# Patient Record
Sex: MALE | Race: BLACK OR AFRICAN AMERICAN | NOT HISPANIC OR LATINO | Employment: FULL TIME | URBAN - METROPOLITAN AREA
[De-identification: names, ages, dates, MRNs, and addresses within clinical notes are randomized per-mention and may not be internally consistent; named-entity substitution may affect disease eponyms.]

---

## 2018-03-16 ENCOUNTER — HOSPITAL ENCOUNTER (EMERGENCY)
Facility: HOSPITAL | Age: 43
Discharge: HOME | End: 2018-03-16
Attending: EMERGENCY MEDICINE
Payer: COMMERCIAL

## 2018-03-16 VITALS
OXYGEN SATURATION: 100 % | HEIGHT: 67 IN | RESPIRATION RATE: 18 BRPM | DIASTOLIC BLOOD PRESSURE: 92 MMHG | SYSTOLIC BLOOD PRESSURE: 144 MMHG | HEART RATE: 104 BPM | BODY MASS INDEX: 29.82 KG/M2 | WEIGHT: 190 LBS | TEMPERATURE: 99 F

## 2018-03-16 DIAGNOSIS — N39.0 URINARY TRACT INFECTION WITHOUT HEMATURIA, SITE UNSPECIFIED: Primary | ICD-10-CM

## 2018-03-16 LAB
BACTERIA #/AREA URNS HPF: ABNORMAL /HPF
BILIRUB UR QL STRIP: NEGATIVE
CLARITY UR: ABNORMAL
COLOR UR: YELLOW
GLUCOSE UR QL STRIP: NEGATIVE
HGB UR QL STRIP: NEGATIVE
KETONES UR QL STRIP: NEGATIVE
LEUKOCYTE ESTERASE UR QL STRIP: ABNORMAL
MICROSCOPIC COMMENT: ABNORMAL
NITRITE UR QL STRIP: NEGATIVE
PH UR STRIP: 6 [PH] (ref 5–8)
PROT UR QL STRIP: NEGATIVE
RBC #/AREA URNS HPF: 1 /HPF (ref 0–4)
SP GR UR STRIP: <=1.005 (ref 1–1.03)
SQUAMOUS #/AREA URNS HPF: 2 /HPF
URN SPEC COLLECT METH UR: ABNORMAL
UROBILINOGEN UR STRIP-ACNC: NEGATIVE EU/DL
WBC #/AREA URNS HPF: 30 /HPF (ref 0–5)
WBC CLUMPS URNS QL MICRO: ABNORMAL

## 2018-03-16 PROCEDURE — 81000 URINALYSIS NONAUTO W/SCOPE: CPT

## 2018-03-16 PROCEDURE — 99283 EMERGENCY DEPT VISIT LOW MDM: CPT

## 2018-03-16 RX ORDER — NITROFURANTOIN 25; 75 MG/1; MG/1
100 CAPSULE ORAL 2 TIMES DAILY
Qty: 14 CAPSULE | Refills: 0 | Status: SHIPPED | OUTPATIENT
Start: 2018-03-16 | End: 2018-03-21

## 2018-03-16 NOTE — ED PROVIDER NOTES
Encounter Date: 3/16/2018    SCRIBE #1 NOTE: I, Sarah Heath, am scribing for, and in the presence of,  Dr. Horton. I have scribed the entire note.       History     Chief Complaint   Patient presents with    Fever     accompanied by dysuria and frequency; states symptoms began today; denies back pain; last tylenol taken one hour pta; states taking amoxicillin x1 week and being treated for UTI     Time seen by provider: 2:54 AM     This is a 42 y.o. male with PMHx of HTN who presents with fever with temperature of 103F at triage onset x 1 day. He also c/o dysuria, urinary frequency, and abdominal pain. He denies nausea and vomiting. He reports past hx of several UTIs with most recent antibiotic completed 2 weeks ago. NKDA.       The history is provided by the patient.     Review of patient's allergies indicates:  No Known Allergies  Past Medical History:   Diagnosis Date    Hypertension      History reviewed. No pertinent surgical history.  History reviewed. No pertinent family history.  Social History   Substance Use Topics    Smoking status: Never Smoker    Smokeless tobacco: Never Used    Alcohol use No     Review of Systems   Constitutional: Positive for fever.   HENT: Negative for sore throat.    Respiratory: Negative for shortness of breath.    Cardiovascular: Negative for chest pain.   Gastrointestinal: Positive for abdominal pain. Negative for nausea and vomiting.   Genitourinary: Positive for dysuria and frequency.   Musculoskeletal: Negative for back pain.   Skin: Negative for rash.   Neurological: Negative for weakness.   Hematological: Does not bruise/bleed easily.   All other systems reviewed and are negative.      Physical Exam     Initial Vitals [03/16/18 0017]   BP Pulse Resp Temp SpO2   135/88 103 18 99.4 °F (37.4 °C) 97 %      MAP       103.67         Physical Exam    Nursing note and vitals reviewed.  Constitutional: He appears well-developed and well-nourished. He is not diaphoretic. No  distress.   HENT:   Head: Normocephalic and atraumatic.   Mouth/Throat: Oropharynx is clear and moist.   Eyes: Conjunctivae and EOM are normal. Pupils are equal, round, and reactive to light.   Neck: Normal range of motion. Neck supple. No tracheal deviation present.   Cardiovascular: Normal rate, regular rhythm and normal heart sounds. Exam reveals no gallop and no friction rub.    No murmur heard.  Pulmonary/Chest: Breath sounds normal. No respiratory distress. He has no wheezes. He has no rhonchi. He has no rales.   Abdominal: Soft. Bowel sounds are normal. He exhibits no distension. There is tenderness (mild) in the suprapubic area. There is no rebound and no guarding.   Musculoskeletal: Normal range of motion. He exhibits no edema or tenderness.   Neurological: He is alert and oriented to person, place, and time. He has normal strength.   Skin: Skin is warm and dry. No erythema. No pallor.         ED Course   Procedures  Labs Reviewed   URINALYSIS - Abnormal; Notable for the following:        Result Value    Appearance, UA Hazy (*)     Specific Gravity, UA <=1.005 (*)     Leukocytes, UA 2+ (*)     All other components within normal limits   URINALYSIS MICROSCOPIC - Abnormal; Notable for the following:     WBC, UA 30 (*)     WBC Clumps, UA Occasional (*)     Bacteria, UA Few (*)     All other components within normal limits   CULTURE, URINE             Medical Decision Making:   Clinical Tests:   Lab Tests: Ordered  ED Management:  42-year-old male with a UTI.  Patient reports having of recurrent bladder infections.  He has no signs of urosepsis.  He'll be discharged with a prescription for Macrobid and advised to follow-up with his primary physician as soon as able for recheck and further treatment as warranted.            Scribe Attestation:   Scribe #1: I performed the above scribed service and the documentation accurately describes the services I performed. I attest to the accuracy of the note.                Clinical Impression:     1. Urinary tract infection without hematuria, site unspecified        I, Dr. Jacques Horton, personally performed the services described in this documentation. All medical record entries made by the scribe were at my direction and in my presence. I have reviewed the chart and agree that the record reflects my personal performance and is accurate and complete. Jacques Horton MD.  4:23 AM 03/16/2018                           Jacques Horton MD  03/16/18 0425

## 2018-03-16 NOTE — ED NOTES
Pt presents to ED c/o dysuria, urgency, and frequency with urination. Pt denies hematuria. Pt reports symptoms began today. Pt reports having fever, states last dose of tylenol at 2300.

## 2018-03-24 ENCOUNTER — OFFICE VISIT (OUTPATIENT)
Dept: URGENT CARE | Facility: CLINIC | Age: 43
End: 2018-03-24
Payer: COMMERCIAL

## 2018-03-24 VITALS
HEART RATE: 91 BPM | OXYGEN SATURATION: 98 % | BODY MASS INDEX: 29.03 KG/M2 | DIASTOLIC BLOOD PRESSURE: 78 MMHG | WEIGHT: 185 LBS | RESPIRATION RATE: 20 BRPM | HEIGHT: 67 IN | SYSTOLIC BLOOD PRESSURE: 122 MMHG | TEMPERATURE: 99 F

## 2018-03-24 DIAGNOSIS — R50.9 FEVER, UNSPECIFIED FEVER CAUSE: Primary | ICD-10-CM

## 2018-03-24 DIAGNOSIS — N12 PYELONEPHRITIS: ICD-10-CM

## 2018-03-24 DIAGNOSIS — R11.2 NON-INTRACTABLE VOMITING WITH NAUSEA, UNSPECIFIED VOMITING TYPE: ICD-10-CM

## 2018-03-24 LAB
BILIRUB UR QL STRIP: NEGATIVE
GLUCOSE UR QL STRIP: NEGATIVE
KETONES UR QL STRIP: POSITIVE
LEUKOCYTE ESTERASE UR QL STRIP: POSITIVE
PH, POC UA: 6.5 (ref 5–8)
POC BLOOD, URINE: NEGATIVE
POC NITRATES, URINE: POSITIVE
PROT UR QL STRIP: NEGATIVE
SP GR UR STRIP: 1.01 (ref 1–1.03)
UROBILINOGEN UR STRIP-ACNC: NORMAL (ref 0.3–2.2)

## 2018-03-24 PROCEDURE — 99204 OFFICE O/P NEW MOD 45 MIN: CPT | Mod: 25,S$GLB,, | Performed by: FAMILY MEDICINE

## 2018-03-24 PROCEDURE — 81003 URINALYSIS AUTO W/O SCOPE: CPT | Mod: QW,S$GLB,, | Performed by: FAMILY MEDICINE

## 2018-03-24 PROCEDURE — 96372 THER/PROPH/DIAG INJ SC/IM: CPT | Mod: S$GLB,,, | Performed by: FAMILY MEDICINE

## 2018-03-24 RX ORDER — ONDANSETRON 8 MG/1
8 TABLET, ORALLY DISINTEGRATING ORAL
Status: COMPLETED | OUTPATIENT
Start: 2018-03-24 | End: 2018-03-24

## 2018-03-24 RX ORDER — CIPROFLOXACIN 500 MG/1
500 TABLET ORAL 2 TIMES DAILY
Qty: 20 TABLET | Refills: 0 | Status: SHIPPED | OUTPATIENT
Start: 2018-03-24 | End: 2018-04-03

## 2018-03-24 RX ORDER — CEFTRIAXONE 1 G/1
1 INJECTION, POWDER, FOR SOLUTION INTRAMUSCULAR; INTRAVENOUS
Status: COMPLETED | OUTPATIENT
Start: 2018-03-24 | End: 2018-03-24

## 2018-03-24 RX ADMIN — ONDANSETRON 8 MG: 8 TABLET, ORALLY DISINTEGRATING ORAL at 01:03

## 2018-03-24 RX ADMIN — CEFTRIAXONE 1 G: 1 INJECTION, POWDER, FOR SOLUTION INTRAMUSCULAR; INTRAVENOUS at 01:03

## 2018-03-24 NOTE — PATIENT INSTRUCTIONS
Anatomy of the Male Urinary Tract  Your urinary tract helps to get rid of your bodys liquid waste. The kidneys constantly filter the blood to collect unneeded chemicals and water, making urine. Urine travels through the ureters to the bladder. The bladder fills with urine, holding it until youre ready to release it. Signals from the brain tell the sphincter (muscles around the opening of the bladder) when to let urine flow out of the bladder. The urethra is the tube that carries urine from the bladder out of the body. In men, the prostate gland wraps around the urethra near the bladder.     Front view of male urinary tract.     Date Last Reviewed: 1/1/2017 © 2000-2017 [employer]. [address] [place] [address], [place] [address]. All rights reserved. This information is not intended as a substitute for professional medical care. Always follow your healthcare professional's instructions.        Pyelonephritis or Kidney Infection (Adult, Male)  An infection of one or both kidneys is called pyelonephritis. It usually happens when bacteria (or rarely, viruses, fungi, or other disease-causing organisms) get into the kidneys. The bacteria (or other disease-causing organisms) can enter the kidneys from the bladder or blood traveling from other parts of the body to cause pyelonephritis. A kidney infection can become serious. It can cause severe illness, scarring of the kidneys, or kidney failure if not treated properly.     Common causes include:  · Not keeping the genital area clean and dry, which promotes the growth of bacteria  · Wearing tight pants or underwear, which allows moisture to build up in the genital area, helping bacteria grow  · Holding urine for long periods of time  · Dehydration  Kidney infections can cause symptoms similar to bladder infections. The infection can cause one or more of these symptoms:  · Pain or burning when urinating  · Having to urinate more often than usual  · Blood in urine  (pink or red)  · Belly pain or discomfort, usually in the lower abdomen  · Pain in the side or back  · Pain above the pubic bone  · Fever or chills  · Vomiting  · Loss of appetite  Treatment is oral antibiotics, or in more severe cases, intramuscular or intravenous (IV) antibiotics. These are started right away. Treatment helps prevent a more serious kidney infection.  Medicines  Medicines can help in the treatment of kidney infection:  · Take antibiotics until they are used up, even if you feel better. It is important to finish them to make sure the infection is gone.  · Unless another medicine was given, you can use over-the-counter medicines for pain, fever, or discomfort. If you have chronic liver or kidney disease, talk with your healthcare provider before taking these medicines. Also tell your provider if you've ever had a stomach ulcer of gastrointestinal (GI) bleeding, or are taking blood thinners.  Home care  The following guidelines can help you care for yourself at home:  · Stay home from work or school. Rest in bed until your fever breaks and you are feeling better, or as advised by your healthcare provider.  · Drink lots of fluid unless you must restrict fluids for other medical reasons. This will force the medicine into your urinary system and help flush the bacteria out of your body. Ask your healthcare provider how much you should drink.  · Avoid sex until you have finished all of your medicine and your symptoms are gone.  · Avoid caffeine, alcohol, and spicy foods. These foods may irritate the kidneys and bladder.  · Wear loose-fitting clothes and cotton underwear.  Prevention  These self-care steps can help prevent future infections:  · Drink plenty of fluids to prevent dehydration and flush out the bladder. Do this unless you must restrict your fluids for other health reasons, or your healthcare provider told you not to.  · Proper cleaning after going to the bathroom is important.  · Clean your  penis regularly. If you aren't circumcised, pull back the foreskin when cleaning.  · Urinate more often. Don't try to hold urine in for a long time.  · Avoid tight-fitting pants and underwear.  · Improve your diet and prevent constipation. Eat more fresh fruit, vegetables, and fiber. Eat less junk and fatty foods. Constipation can increase your chance of getting a urinary tract infection. Talk with your healthcare provider if you have trouble with bowel movements.  · Urinate right after sex to flush out the bladder.  Follow-up care  Follow up with your healthcare provider, or as advised. Additional testing may be needed to make sure the infection is clearing. Close follow-up and further testing is very important to find the cause and to prevent future infections.  If a urine culture was done, you will be contacted if your treatment needs to be changed. If directed, you can call to find out the results.  If you had an X-ray, CT scan, or another diagnostic test, you will be notified of any new findings that may affect your care.  Call 911  Call 911 if any of the following occur:  · Trouble breathing  · Fainting or loss of consciousness  · Rapid or very slow heart rate  · Weakness, dizziness, or fainting  · Difficulty arousing or confusion  When to seek medical advice  Call your healthcare provider right away if any of the following occur:  · Fever of 100.4º F (38º C) or higher after 48 hours of treatment, or as directed by your healthcare provider  · Not feeling better within 1 to 2 days after starting antibiotics  · Any symptom that continues after 3 days of treatment  · Increasing pain in the stomach, back, side, or groin area  · Repeated vomiting  · Not able to take prescribed medicine due to nausea or another reason  · Bloody, dark-colored, or foul smelling urine  · Trouble urinating or decreased urine output  · No urine for 8 hours, no tears when crying, sunken eyes, or dry mouth  Date Last Reviewed: 10/1/2016  ©  0821-5010 [employer]. [address] [place] [address], [place] [address]. All rights reserved. This information is not intended as a substitute for professional medical care. Always follow your healthcare professional's instructions.      Camron was seen today for abdominal pain and nausea.    Diagnoses and all orders for this visit:    Fever, unspecified fever cause  -     POCT Urinalysis, Dipstick, Automated, W/O Scope  -     Urine culture    Non-intractable vomiting with nausea, unspecified vomiting type  -     POCT Urinalysis, Dipstick, Automated, W/O Scope  -     Urine culture    Pyelonephritis  -     cefTRIAXone injection 1 g; Inject 1 g into the muscle one time.  -     ondansetron disintegrating tablet 8 mg; Take 1 tablet (8 mg total) by mouth one time.  -     ciprofloxacin HCl (CIPRO) 500 MG tablet; Take 1 tablet (500 mg total) by mouth 2 (two) times daily.            Follow Up Comments   Make sure that you follow up with your primary care doctor in the next 2-5 days if needed .  Return to the Urgent Care if signs or symptoms change and certainly if you have worsening symptoms go to the nearest emergency department for further evaluation.     Corrie Andrade MD

## 2018-03-24 NOTE — PROGRESS NOTES
"Subjective:       Patient ID: Camron Knight Jr. is a 42 y.o. male.    Vitals:  height is 5' 7" (1.702 m) and weight is 83.9 kg (185 lb). His tympanic temperature is 98.5 °F (36.9 °C). His blood pressure is 122/78 and his pulse is 91. His respiration is 20 and oxygen saturation is 98%.     Chief Complaint: Abdominal Pain and Nausea    Patient reports that he was diagnosed with UTI  And was placed on antibiotic but he has had worsening fever, nausea and back pain since then.  The antibiotic was Bactrim by history.  He reports a past history of UTI and denies STDs.       Abdominal Pain   This is a new problem. The current episode started yesterday. The onset quality is gradual. The problem occurs intermittently. The problem has been gradually worsening. The pain is located in the generalized abdominal region. The pain is at a severity of 8/10. The pain is moderate. The quality of the pain is sharp, cramping, burning and aching. Associated symptoms include hematuria, nausea and vomiting. Pertinent negatives include no constipation, diarrhea, dysuria, fever, hematochezia or melena. Nothing aggravates the pain. The pain is relieved by nothing. He has tried acetaminophen for the symptoms. The treatment provided mild relief.   Nausea   This is a new problem. The current episode started yesterday. The problem occurs intermittently. The problem has been gradually worsening. Associated symptoms include abdominal pain, chills, nausea and vomiting. Pertinent negatives include no chest pain, congestion, coughing, fever, joint swelling or sore throat. Nothing aggravates the symptoms. He has tried NSAIDs for the symptoms.     Review of Systems   Constitution: Positive for chills, decreased appetite and malaise/fatigue. Negative for fever.   HENT: Negative for congestion and sore throat.    Eyes: Negative for blurred vision and visual disturbance.   Cardiovascular: Negative for chest pain and leg swelling.   Respiratory: Positive " for shortness of breath. Negative for cough.    Endocrine: Negative for polydipsia, polyphagia and polyuria.   Hematologic/Lymphatic: Negative for bleeding problem. Does not bruise/bleed easily.   Skin: Negative for flushing and itching.   Musculoskeletal: Positive for back pain. Negative for joint swelling.   Gastrointestinal: Positive for abdominal pain, nausea and vomiting. Negative for constipation, diarrhea, hematochezia and melena.   Genitourinary: Positive for flank pain and hematuria. Negative for dysuria.   Neurological: Positive for light-headedness. Negative for difficulty with concentration and dizziness.   Psychiatric/Behavioral: The patient has insomnia. The patient is not nervous/anxious.    Allergic/Immunologic: Negative for persistent infections.       Objective:      Physical Exam   Constitutional: He is oriented to person, place, and time. He appears well-developed and well-nourished.  Non-toxic appearance. He does not have a sickly appearance. He appears ill. No distress.   HENT:   Head: Normocephalic and atraumatic.   Right Ear: External ear normal.   Left Ear: External ear normal.   Nose: Nose normal. No nasal deformity. No epistaxis.   Mouth/Throat: Oropharynx is clear and moist and mucous membranes are normal.   Eyes: Conjunctivae and lids are normal.   Neck: Trachea normal, normal range of motion and phonation normal. Neck supple.   Cardiovascular: Normal rate, regular rhythm, normal heart sounds and intact distal pulses.    Pulmonary/Chest: Effort normal and breath sounds normal. He has no decreased breath sounds. He has no wheezes. He has no rhonchi. He has no rales.   Abdominal: Soft. Normal appearance and bowel sounds are normal. He exhibits no distension. There is no hepatosplenomegaly. There is tenderness in the right lower quadrant and suprapubic area. There is CVA tenderness. There is no rigidity and no guarding.       Musculoskeletal: Normal range of motion.   Neurological: He is  alert and oriented to person, place, and time. He has normal reflexes.   Skin: Skin is warm, dry and intact. He is not diaphoretic. There is pallor.   Psychiatric: He has a normal mood and affect. His speech is normal and behavior is normal. Judgment and thought content normal. Cognition and memory are normal.   Nursing note and vitals reviewed.      Assessment:       1. Fever, unspecified fever cause    2. Non-intractable vomiting with nausea, unspecified vomiting type    3. Pyelonephritis        Plan:       Results for orders placed or performed in visit on 03/24/18   POCT Urinalysis, Dipstick, Automated, W/O Scope   Result Value Ref Range    POC Blood, Urine Negative Negative    POC Bilirubin, Urine Negative Negative    POC Urobilinogen, Urine Normal 0.3 - 2.2    POC Ketones, Urine Positive (A) Negative    POC Protein, Urine Negative Negative    POC Nitrates, Urine Positive (A) Negative    POC Glucose, Urine Negative Negative    pH, UA 6.5 5 - 8    POC Specific Gravity, Urine 1.010 1.003 - 1.029    POC Leukocytes, Urine Positive (A) Negative         Fever, unspecified fever cause  -     POCT Urinalysis, Dipstick, Automated, W/O Scope  -     Urine culture    Non-intractable vomiting with nausea, unspecified vomiting type  -     POCT Urinalysis, Dipstick, Automated, W/O Scope  -     Urine culture    Pyelonephritis  -     cefTRIAXone injection 1 g; Inject 1 g into the muscle one time.  -     ondansetron disintegrating tablet 8 mg; Take 1 tablet (8 mg total) by mouth one time.  -     ciprofloxacin HCl (CIPRO) 500 MG tablet; Take 1 tablet (500 mg total) by mouth 2 (two) times daily.  Dispense: 20 tablet; Refill: 0      Follow Up Comments   Make sure that you follow up with your primary care doctor in the next 2-5 days if needed .  Return to the Urgent Care if signs or symptoms change and certainly if you have worsening symptoms go to the nearest emergency department for further evaluation.      urine culture shows  sensitivity to Rocephin but resistant to cipro.  Switching oral medication to macrodantin. Culture shows that bacteria is sensitive to this.

## 2018-03-28 ENCOUNTER — TELEPHONE (OUTPATIENT)
Dept: URGENT CARE | Facility: CLINIC | Age: 43
End: 2018-03-28

## 2018-03-28 LAB
BACTERIA UR CULT: ABNORMAL
BACTERIA UR CULT: ABNORMAL
OTHER ANTIBIOTIC SUSC ISLT: ABNORMAL

## 2018-03-28 RX ORDER — NITROFURANTOIN 25; 75 MG/1; MG/1
100 CAPSULE ORAL 2 TIMES DAILY
Qty: 14 CAPSULE | Refills: 0 | Status: SHIPPED | OUTPATIENT
Start: 2018-03-28 | End: 2018-04-04

## 2018-03-28 NOTE — TELEPHONE ENCOUNTER
----- Message from Corrie Andrade MD sent at 3/28/2018  1:19 PM CDT -----  Please call the patient regarding his abnormal result.  Culture results are showing that the infection in the urine is resistant to Cipro.  New antibiotic will be sent to his pharmacy.  He will be on Macrobid and stop Cipro.

## 2018-03-28 NOTE — TELEPHONE ENCOUNTER
Called patient, informed patient I was calling to inform him of his results. Informed patient that his urine culture is showing an infection and it is resistant to Cipro. Informed patient the provider will send a RX for macrobid. Patient states he would like medication sent to [facility] on [address] in [place].

## 2020-08-29 ENCOUNTER — HOSPITAL ENCOUNTER (EMERGENCY)
Facility: HOSPITAL | Age: 45
Discharge: HOME | End: 2020-08-29
Attending: EMERGENCY MEDICINE
Payer: COMMERCIAL

## 2020-08-29 VITALS
WEIGHT: 188 LBS | OXYGEN SATURATION: 95 % | RESPIRATION RATE: 20 BRPM | TEMPERATURE: 99 F | HEART RATE: 83 BPM | SYSTOLIC BLOOD PRESSURE: 147 MMHG | BODY MASS INDEX: 29.51 KG/M2 | HEIGHT: 67 IN | DIASTOLIC BLOOD PRESSURE: 77 MMHG

## 2020-08-29 DIAGNOSIS — S13.9XXA CERVICAL SPRAIN, INITIAL ENCOUNTER: ICD-10-CM

## 2020-08-29 DIAGNOSIS — S40.022A CONTUSION OF LEFT UPPER ARM, INITIAL ENCOUNTER: ICD-10-CM
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
## 2020-08-29 DIAGNOSIS — S06.9X9A HEAD INJURY WITH LOSS OF CONSCIOUSNESS: ICD-10-CM

## 2020-08-29 PROCEDURE — 99284 EMERGENCY DEPT VISIT MOD MDM: CPT | Mod: 25,ER

## 2020-08-29 PROCEDURE — 25000003 PHARM REV CODE 250: Mod: ER | Performed by: EMERGENCY MEDICINE

## 2020-08-29 RX ORDER — KETOROLAC TROMETHAMINE 10 MG/1
10 TABLET, FILM COATED ORAL
Status: COMPLETED | OUTPATIENT
Start: 2020-08-29 | End: 2020-08-29

## 2020-08-29 RX ORDER — CYCLOBENZAPRINE HCL 10 MG
10 TABLET ORAL 3 TIMES DAILY PRN
Qty: 15 TABLET | Refills: 0 | Status: SHIPPED | OUTPATIENT
Start: 2020-08-29 | End: 2020-09-03

## 2020-08-29 RX ORDER — MELOXICAM 7.5 MG/1
7.5 TABLET ORAL DAILY
Qty: 7 TABLET | Refills: 0 | Status: SHIPPED | OUTPATIENT
Start: 2020-08-29 | End: 2020-09-05

## 2020-08-29 RX ORDER — CYCLOBENZAPRINE HCL 10 MG
10 TABLET ORAL
Status: COMPLETED | OUTPATIENT
Start: 2020-08-29 | End: 2020-08-29

## 2020-08-29 RX ADMIN — CYCLOBENZAPRINE 10 MG: 10 TABLET, FILM COATED ORAL at 08:08

## 2020-08-29 RX ADMIN — KETOROLAC TROMETHAMINE 10 MG: 10 TABLET, FILM COATED ORAL at 08:08

## 2020-08-29 NOTE — Clinical Note
Camron Knight was seen and treated in our emergency department on 8/29/2020.  He may return to work on 08/31/2020.       If you have any questions or concerns, please don't hesitate to call.      Sarika Pandya RN
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
## 2020-08-30 NOTE — ED NOTES
Reviewed discharge instructions and rx with pt.  Explained about follow up, rest, ice/ heat therapy.  Pt verbalized understanding.  Pt discharged in stable condition.

## 2020-08-30 NOTE — ED NOTES
Pt resting in rm.  NADN - Bed low and locked.  VS updated.  Improving.  Call bell in reach.  Denies any needs at this time.  Instruct to call. Will continue to monitor.
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
## 2021-07-22 ENCOUNTER — OFFICE VISIT (OUTPATIENT)
Dept: URGENT CARE | Facility: CLINIC | Age: 46
End: 2021-07-22
Payer: COMMERCIAL

## 2021-07-22 VITALS
OXYGEN SATURATION: 97 % | BODY MASS INDEX: 29.03 KG/M2 | DIASTOLIC BLOOD PRESSURE: 90 MMHG | WEIGHT: 185 LBS | RESPIRATION RATE: 18 BRPM | HEIGHT: 67 IN | TEMPERATURE: 98 F | SYSTOLIC BLOOD PRESSURE: 132 MMHG | HEART RATE: 82 BPM

## 2021-07-22 DIAGNOSIS — U07.1 COVID-19 VIRUS DETECTED: ICD-10-CM

## 2021-07-22 DIAGNOSIS — U07.1 LAB TEST POSITIVE FOR DETECTION OF COVID-19 VIRUS: ICD-10-CM

## 2021-07-22 DIAGNOSIS — R05.9 COUGH: Primary | ICD-10-CM

## 2021-07-22 DIAGNOSIS — J06.9 URI, ACUTE: ICD-10-CM

## 2021-07-22 LAB
CTP QC/QA: YES
SARS-COV-2 RDRP RESP QL NAA+PROBE: POSITIVE

## 2021-07-22 PROCEDURE — 99203 OFFICE O/P NEW LOW 30 MIN: CPT | Mod: S$GLB,,, | Performed by: FAMILY MEDICINE

## 2021-07-22 PROCEDURE — U0002 COVID-19 LAB TEST NON-CDC: HCPCS | Mod: QW,S$GLB,, | Performed by: FAMILY MEDICINE

## 2021-07-22 PROCEDURE — 3008F BODY MASS INDEX DOCD: CPT | Mod: CPTII,S$GLB,, | Performed by: FAMILY MEDICINE

## 2021-07-22 PROCEDURE — 3008F PR BODY MASS INDEX (BMI) DOCUMENTED: ICD-10-PCS | Mod: CPTII,S$GLB,, | Performed by: FAMILY MEDICINE

## 2021-07-22 PROCEDURE — U0002: ICD-10-PCS | Mod: QW,S$GLB,, | Performed by: FAMILY MEDICINE

## 2021-07-22 PROCEDURE — 99203 PR OFFICE/OUTPT VISIT, NEW, LEVL III, 30-44 MIN: ICD-10-PCS | Mod: S$GLB,,, | Performed by: FAMILY MEDICINE

## 2021-07-22 RX ORDER — LORATADINE 10 MG/1
10 TABLET ORAL DAILY
Qty: 30 TABLET | Refills: 0 | Status: SHIPPED | OUTPATIENT
Start: 2021-07-22 | End: 2021-08-21

## 2021-07-26 ENCOUNTER — NURSE TRIAGE (OUTPATIENT)
Dept: ADMINISTRATIVE | Facility: CLINIC | Age: 46
End: 2021-07-26

## 2021-07-26 ENCOUNTER — HOSPITAL ENCOUNTER (EMERGENCY)
Facility: HOSPITAL | Age: 46
Discharge: HOME | End: 2021-07-26
Attending: EMERGENCY MEDICINE
Payer: COMMERCIAL

## 2021-07-26 VITALS
WEIGHT: 185 LBS | SYSTOLIC BLOOD PRESSURE: 138 MMHG | RESPIRATION RATE: 18 BRPM | DIASTOLIC BLOOD PRESSURE: 88 MMHG | TEMPERATURE: 99 F | OXYGEN SATURATION: 98 % | HEART RATE: 88 BPM | HEIGHT: 67 IN | BODY MASS INDEX: 29.03 KG/M2

## 2021-07-26 DIAGNOSIS — J12.82 PNEUMONIA DUE TO COVID-19 VIRUS: Primary | ICD-10-CM

## 2021-07-26 DIAGNOSIS — R07.9 CHEST PAIN: ICD-10-CM

## 2021-07-26 DIAGNOSIS — R94.31 ABNORMAL EKG: ICD-10-CM

## 2021-07-26 DIAGNOSIS — R74.8 ELEVATED CPK: ICD-10-CM

## 2021-07-26 DIAGNOSIS — U07.1 PNEUMONIA DUE TO COVID-19 VIRUS: Primary | ICD-10-CM

## 2021-07-26 LAB
ALBUMIN SERPL BCP-MCNC: 4.2 G/DL (ref 3.5–5.2)
ALP SERPL-CCNC: 58 U/L (ref 38–126)
ALT SERPL W/O P-5'-P-CCNC: 40 U/L (ref 10–44)
ANION GAP SERPL CALC-SCNC: 5 MMOL/L (ref 8–16)
AST SERPL-CCNC: 72 U/L (ref 15–46)
BASOPHILS # BLD AUTO: 0.01 K/UL (ref 0–0.2)
BASOPHILS NFR BLD: 0.2 % (ref 0–1.9)
BILIRUB SERPL-MCNC: 0.8 MG/DL (ref 0.1–1)
BILIRUB UR QL STRIP: NEGATIVE
CALCIUM SERPL-MCNC: 9 MG/DL (ref 8.7–10.5)
CHLORIDE SERPL-SCNC: 99 MMOL/L (ref 95–110)
CK SERPL-CCNC: 2097 U/L (ref 55–170)
CK SERPL-CCNC: 2151 U/L (ref 55–170)
CLARITY UR REFRACT.AUTO: CLEAR
CO2 SERPL-SCNC: 32 MMOL/L (ref 23–29)
COLOR UR AUTO: YELLOW
CREAT SERPL-MCNC: 1.2 MG/DL (ref 0.5–1.4)
DIFFERENTIAL METHOD: ABNORMAL
EOSINOPHIL # BLD AUTO: 0 K/UL (ref 0–0.5)
EOSINOPHIL NFR BLD: 0 % (ref 0–8)
ERYTHROCYTE [DISTWIDTH] IN BLOOD BY AUTOMATED COUNT: 11.2 % (ref 11.5–14.5)
EST. GFR  (AFRICAN AMERICAN): >60 ML/MIN/1.73 M^2
EST. GFR  (NON AFRICAN AMERICAN): >60 ML/MIN/1.73 M^2
GLUCOSE SERPL-MCNC: 129 MG/DL (ref 70–110)
GLUCOSE UR QL STRIP: NEGATIVE
HCT VFR BLD AUTO: 45.3 % (ref 40–54)
HGB BLD-MCNC: 15.1 G/DL (ref 14–18)
HGB UR QL STRIP: NEGATIVE
IMM GRANULOCYTES # BLD AUTO: 0 K/UL (ref 0–0.04)
IMM GRANULOCYTES NFR BLD AUTO: 0 % (ref 0–0.5)
KETONES UR QL STRIP: NEGATIVE
LACTATE SERPL-SCNC: 1.3 MMOL/L (ref 0.5–2.2)
LEUKOCYTE ESTERASE UR QL STRIP: ABNORMAL
LYMPHOCYTES # BLD AUTO: 0.8 K/UL (ref 1–4.8)
LYMPHOCYTES NFR BLD: 16.8 % (ref 18–48)
MCH RBC QN AUTO: 31.5 PG (ref 27–31)
MCHC RBC AUTO-ENTMCNC: 33.3 G/DL (ref 32–36)
MCV RBC AUTO: 94 FL (ref 82–98)
MICROSCOPIC COMMENT: NORMAL
MONOCYTES # BLD AUTO: 0.4 K/UL (ref 0.3–1)
MONOCYTES NFR BLD: 7.3 % (ref 4–15)
NEUTROPHILS # BLD AUTO: 3.8 K/UL (ref 1.8–7.7)
NEUTROPHILS NFR BLD: 75.7 % (ref 38–73)
NITRITE UR QL STRIP: NEGATIVE
NRBC BLD-RTO: 0 /100 WBC
NT-PROBNP SERPL-MCNC: 30 PG/ML (ref 5–450)
PH UR STRIP: 6 [PH] (ref 5–8)
PLATELET # BLD AUTO: 164 K/UL (ref 150–450)
PMV BLD AUTO: 9.3 FL (ref 9.2–12.9)
POTASSIUM SERPL-SCNC: 3.9 MMOL/L (ref 3.5–5.1)
PROT SERPL-MCNC: 7.9 G/DL (ref 6–8.4)
PROT UR QL STRIP: NEGATIVE
RBC # BLD AUTO: 4.8 M/UL (ref 4.6–6.2)
SODIUM SERPL-SCNC: 136 MMOL/L (ref 136–145)
SP GR UR STRIP: 1.01 (ref 1–1.03)
TROPONIN I SERPL-MCNC: <0.012 NG/ML (ref 0.01–0.03)
URN SPEC COLLECT METH UR: ABNORMAL
UROBILINOGEN UR STRIP-ACNC: NEGATIVE EU/DL
UUN UR-MCNC: 12 MG/DL (ref 2–20)
WBC # BLD AUTO: 4.95 K/UL (ref 3.9–12.7)
WBC #/AREA URNS AUTO: 2 /HPF (ref 0–5)

## 2021-07-26 PROCEDURE — 96361 HYDRATE IV INFUSION ADD-ON: CPT | Mod: ER

## 2021-07-26 PROCEDURE — 99284 EMERGENCY DEPT VISIT MOD MDM: CPT | Mod: 25,ER

## 2021-07-26 PROCEDURE — 25000003 PHARM REV CODE 250: Mod: ER | Performed by: PHYSICIAN ASSISTANT

## 2021-07-26 PROCEDURE — 80053 COMPREHEN METABOLIC PANEL: CPT | Mod: ER | Performed by: PHYSICIAN ASSISTANT

## 2021-07-26 PROCEDURE — 82550 ASSAY OF CK (CPK): CPT | Mod: 91,ER | Performed by: PHYSICIAN ASSISTANT

## 2021-07-26 PROCEDURE — 85025 COMPLETE CBC W/AUTO DIFF WBC: CPT | Mod: ER | Performed by: PHYSICIAN ASSISTANT

## 2021-07-26 PROCEDURE — 84484 ASSAY OF TROPONIN QUANT: CPT | Mod: ER | Performed by: PHYSICIAN ASSISTANT

## 2021-07-26 PROCEDURE — 93010 EKG 12-LEAD: ICD-10-PCS | Mod: ,,, | Performed by: INTERNAL MEDICINE

## 2021-07-26 PROCEDURE — 83605 ASSAY OF LACTIC ACID: CPT | Mod: ER | Performed by: PHYSICIAN ASSISTANT

## 2021-07-26 PROCEDURE — 81000 URINALYSIS NONAUTO W/SCOPE: CPT | Mod: ER | Performed by: PHYSICIAN ASSISTANT

## 2021-07-26 PROCEDURE — 93010 ELECTROCARDIOGRAM REPORT: CPT | Mod: ,,, | Performed by: INTERNAL MEDICINE

## 2021-07-26 PROCEDURE — 93005 ELECTROCARDIOGRAM TRACING: CPT | Mod: ER

## 2021-07-26 PROCEDURE — 83880 ASSAY OF NATRIURETIC PEPTIDE: CPT | Mod: ER | Performed by: PHYSICIAN ASSISTANT

## 2021-07-26 PROCEDURE — 96360 HYDRATION IV INFUSION INIT: CPT | Mod: ER

## 2021-07-26 PROCEDURE — 94760 N-INVAS EAR/PLS OXIMETRY 1: CPT | Mod: ER

## 2021-07-26 RX ORDER — ACETAMINOPHEN 500 MG
1000 TABLET ORAL
Status: COMPLETED | OUTPATIENT
Start: 2021-07-26 | End: 2021-07-26

## 2021-07-26 RX ORDER — BENZONATATE 100 MG/1
100 CAPSULE ORAL 3 TIMES DAILY PRN
Qty: 20 CAPSULE | Refills: 0 | Status: SHIPPED | OUTPATIENT
Start: 2021-07-26 | End: 2021-08-05

## 2021-07-26 RX ORDER — AZITHROMYCIN 250 MG/1
250 TABLET, FILM COATED ORAL DAILY
Qty: 6 TABLET | Refills: 0 | Status: SHIPPED | OUTPATIENT
Start: 2021-07-26

## 2021-07-26 RX ORDER — ALBUTEROL SULFATE 90 UG/1
1-2 AEROSOL, METERED RESPIRATORY (INHALATION) EVERY 6 HOURS PRN
Qty: 8 G | Refills: 0 | Status: SHIPPED | OUTPATIENT
Start: 2021-07-26 | End: 2022-07-26

## 2021-07-26 RX ADMIN — SODIUM CHLORIDE 1000 ML: 0.9 INJECTION, SOLUTION INTRAVENOUS at 03:07

## 2021-07-26 RX ADMIN — ACETAMINOPHEN 1000 MG: 500 TABLET ORAL at 02:07

## 2021-07-26 RX ADMIN — SODIUM CHLORIDE 500 ML: 0.9 INJECTION, SOLUTION INTRAVENOUS at 02:07

## 2021-07-28 ENCOUNTER — INFUSION (OUTPATIENT)
Dept: INFECTIOUS DISEASES | Facility: HOSPITAL | Age: 46
End: 2021-07-28
Attending: FAMILY MEDICINE
Payer: COMMERCIAL

## 2021-07-28 ENCOUNTER — HOSPITAL ENCOUNTER (EMERGENCY)
Facility: HOSPITAL | Age: 46
Discharge: LEFT BEFORE DISCHARGE | End: 2021-07-28
Attending: EMERGENCY MEDICINE
Payer: COMMERCIAL

## 2021-07-28 VITALS
OXYGEN SATURATION: 97 % | DIASTOLIC BLOOD PRESSURE: 89 MMHG | WEIGHT: 180 LBS | TEMPERATURE: 100 F | HEART RATE: 116 BPM | BODY MASS INDEX: 28.25 KG/M2 | HEIGHT: 67 IN | SYSTOLIC BLOOD PRESSURE: 160 MMHG | RESPIRATION RATE: 20 BRPM

## 2021-07-28 VITALS
TEMPERATURE: 100 F | DIASTOLIC BLOOD PRESSURE: 89 MMHG | RESPIRATION RATE: 22 BRPM | SYSTOLIC BLOOD PRESSURE: 132 MMHG | HEART RATE: 114 BPM | OXYGEN SATURATION: 96 %

## 2021-07-28 DIAGNOSIS — Z53.21 ELOPED FROM EMERGENCY DEPARTMENT: Primary | ICD-10-CM

## 2021-07-28 DIAGNOSIS — U07.1 COVID-19: Primary | ICD-10-CM

## 2021-07-28 PROCEDURE — 99283 EMERGENCY DEPT VISIT LOW MDM: CPT | Mod: 25

## 2021-07-28 PROCEDURE — 25000003 PHARM REV CODE 250: Performed by: PHYSICIAN ASSISTANT

## 2021-07-28 PROCEDURE — 63600175 PHARM REV CODE 636 W HCPCS: Performed by: INTERNAL MEDICINE

## 2021-07-28 PROCEDURE — 25000003 PHARM REV CODE 250: Performed by: INTERNAL MEDICINE

## 2021-07-28 PROCEDURE — M0243 CASIRIVI AND IMDEVI INFUSION: HCPCS | Performed by: INTERNAL MEDICINE

## 2021-07-28 RX ORDER — ONDANSETRON 4 MG/1
4 TABLET, ORALLY DISINTEGRATING ORAL ONCE AS NEEDED
Status: ACTIVE | OUTPATIENT
Start: 2021-07-28 | End: 2032-12-24

## 2021-07-28 RX ORDER — SODIUM CHLORIDE 0.9 % (FLUSH) 0.9 %
10 SYRINGE (ML) INJECTION
Status: ACTIVE | OUTPATIENT
Start: 2021-07-28

## 2021-07-28 RX ORDER — ONDANSETRON 8 MG/1
8 TABLET, ORALLY DISINTEGRATING ORAL
Status: COMPLETED | OUTPATIENT
Start: 2021-07-28 | End: 2021-07-28

## 2021-07-28 RX ORDER — ACETAMINOPHEN 325 MG/1
650 TABLET ORAL
Status: COMPLETED | OUTPATIENT
Start: 2021-07-28 | End: 2021-07-28

## 2021-07-28 RX ORDER — ACETAMINOPHEN 325 MG/1
650 TABLET ORAL ONCE AS NEEDED
Status: ACTIVE | OUTPATIENT
Start: 2021-07-28 | End: 2032-12-24

## 2021-07-28 RX ORDER — DIPHENHYDRAMINE HYDROCHLORIDE 50 MG/ML
25 INJECTION INTRAMUSCULAR; INTRAVENOUS ONCE AS NEEDED
Status: ACTIVE | OUTPATIENT
Start: 2021-07-28 | End: 2032-12-24

## 2021-07-28 RX ORDER — EPINEPHRINE 0.3 MG/.3ML
0.3 INJECTION SUBCUTANEOUS
Status: ACTIVE | OUTPATIENT
Start: 2021-07-28

## 2021-07-28 RX ORDER — ALBUTEROL SULFATE 90 UG/1
2 AEROSOL, METERED RESPIRATORY (INHALATION)
Status: ACTIVE | OUTPATIENT
Start: 2021-07-28

## 2021-07-28 RX ADMIN — ONDANSETRON 8 MG: 8 TABLET, ORALLY DISINTEGRATING ORAL at 06:07

## 2021-07-28 RX ADMIN — ACETAMINOPHEN 650 MG: 325 TABLET ORAL at 06:07

## 2021-07-28 RX ADMIN — CASIRIVIMAB AND IMDEVIMAB 600 MG: 600; 600 INJECTION, SOLUTION, CONCENTRATE INTRAVENOUS at 09:07

## 2023-01-06 ENCOUNTER — OCCUPATIONAL HEALTH (OUTPATIENT)
Dept: URGENT CARE | Facility: CLINIC | Age: 48
End: 2023-01-06
Payer: COMMERCIAL

## 2023-01-06 DIAGNOSIS — Z13.9 ENCOUNTER FOR SCREENING: Primary | ICD-10-CM

## 2023-01-06 PROCEDURE — 80305 DRUG TEST PRSMV DIR OPT OBS: CPT | Mod: S$GLB,,,

## 2023-01-06 PROCEDURE — 99199 OCC MED MRO FEE: ICD-10-PCS | Mod: S$GLB,,,

## 2023-01-06 PROCEDURE — 80305 OOH COLLECTION ONLY DRUG SCREEN: ICD-10-PCS | Mod: S$GLB,,,

## 2023-01-06 PROCEDURE — 99199 UNLISTED SPECIAL SVC PX/RPRT: CPT | Mod: S$GLB,,,

## 2023-04-22 ENCOUNTER — OFFICE VISIT (OUTPATIENT)
Dept: URGENT CARE | Facility: CLINIC | Age: 48
End: 2023-04-22
Payer: COMMERCIAL

## 2023-04-22 VITALS
HEIGHT: 67 IN | HEART RATE: 79 BPM | OXYGEN SATURATION: 98 % | WEIGHT: 180 LBS | SYSTOLIC BLOOD PRESSURE: 163 MMHG | TEMPERATURE: 98 F | RESPIRATION RATE: 19 BRPM | DIASTOLIC BLOOD PRESSURE: 103 MMHG | BODY MASS INDEX: 28.25 KG/M2

## 2023-04-22 DIAGNOSIS — M54.10 RADICULOPATHY, UNSPECIFIED SPINAL REGION: ICD-10-CM

## 2023-04-22 DIAGNOSIS — M54.2 NECK PAIN: ICD-10-CM

## 2023-04-22 DIAGNOSIS — M79.601 PAIN OF RIGHT UPPER EXTREMITY: Primary | ICD-10-CM

## 2023-04-22 PROCEDURE — 99214 PR OFFICE/OUTPT VISIT, EST, LEVL IV, 30-39 MIN: ICD-10-PCS | Mod: 25,S$GLB,, | Performed by: NURSE PRACTITIONER

## 2023-04-22 PROCEDURE — 96372 THER/PROPH/DIAG INJ SC/IM: CPT | Mod: S$GLB,,, | Performed by: NURSE PRACTITIONER

## 2023-04-22 PROCEDURE — 96372 PR INJECTION,THERAP/PROPH/DIAG2ST, IM OR SUBCUT: ICD-10-PCS | Mod: S$GLB,,, | Performed by: NURSE PRACTITIONER

## 2023-04-22 PROCEDURE — 72040 XR CERVICAL SPINE 2 OR 3 VIEWS: ICD-10-PCS | Mod: FY,S$GLB,, | Performed by: RADIOLOGY

## 2023-04-22 PROCEDURE — 72040 X-RAY EXAM NECK SPINE 2-3 VW: CPT | Mod: FY,S$GLB,, | Performed by: RADIOLOGY

## 2023-04-22 PROCEDURE — 99214 OFFICE O/P EST MOD 30 MIN: CPT | Mod: 25,S$GLB,, | Performed by: NURSE PRACTITIONER

## 2023-04-22 RX ORDER — NAPROXEN 500 MG/1
500 TABLET ORAL 2 TIMES DAILY WITH MEALS
Qty: 20 TABLET | Refills: 0 | Status: SHIPPED | OUTPATIENT
Start: 2023-04-22 | End: 2023-05-02

## 2023-04-22 RX ORDER — KETOROLAC TROMETHAMINE 30 MG/ML
30 INJECTION, SOLUTION INTRAMUSCULAR; INTRAVENOUS
Status: COMPLETED | OUTPATIENT
Start: 2023-04-22 | End: 2023-04-22

## 2023-04-22 RX ORDER — METHOCARBAMOL 500 MG/1
500 TABLET, FILM COATED ORAL 3 TIMES DAILY
Qty: 30 TABLET | Refills: 0 | Status: SHIPPED | OUTPATIENT
Start: 2023-04-22 | End: 2023-05-02

## 2023-04-22 RX ADMIN — KETOROLAC TROMETHAMINE 30 MG: 30 INJECTION, SOLUTION INTRAMUSCULAR; INTRAVENOUS at 05:04

## 2023-04-22 NOTE — PATIENT INSTRUCTIONS
Please drink plenty of fluids.  Please get plenty of rest.  Please return here or go to the Emergency Department for any concerns or worsening of condition.  If you were prescribed a muscle relaxant medication, do not drive or operate heavy equipment or machinery while taking these medications.  If you were not prescribed an anti-inflammatory medication, and if you do not have any history of stomach/intestinal ulcers, or kidney disease, or are not taking a blood thinner such as Coumadin, Plavix, Pradaxa, Eloquis, or Xaralta for example, it is OK to take over the counter Ibuprofen or Advil or Motrin or Aleve as directed.  Do not take these medications on an empty stomach.  If you lose control of your bowel and/or bladder, please go to the nearest Emergency Department immediately.  If you lose sensation in between your legs by your genitalia and/or rectum, please go to the nearest Emergency Department immediately.  If you lose control or sensation of any extremity, please go to the nearest Emergency Department immediately.  Please follow up with your primary care doctor or specialist as needed.    If you  smoke, please stop smoking.

## 2023-04-22 NOTE — PROGRESS NOTES
"Subjective:      Patient ID: Camron Knight Jr. is a 48 y.o. male.    Vitals:  height is 5' 7" (1.702 m) and weight is 81.6 kg (180 lb). His temperature is 98.2 °F (36.8 °C). His blood pressure is 163/103 (abnormal) and his pulse is 79. His respiration is 19 and oxygen saturation is 98%.     Chief Complaint: Arm Pain    Patient presents to the clinic with right arm pain x 3 days. Pain radiates from right side of neck into fingers and is a shooting/shocking pain.  Notes that he may have slept in an uncomfortable position.  Denies chest pain.  Some relief with aleve and ibuprofen but keeps coming back.      Arm Pain   The incident occurred 3 to 5 days ago. The incident occurred at home. There was no injury mechanism. The pain is present in the upper right arm, right hand, right forearm, right fingers, right elbow, right shoulder and left wrist. The quality of the pain is described as shooting and stabbing. The pain radiates to the right arm and right hand. The pain is at a severity of 9/10. The pain is moderate. The pain has been Fluctuating since the incident. Associated symptoms include tingling. Pertinent negatives include no chest pain, muscle weakness or numbness. He has tried acetaminophen and NSAIDs for the symptoms. The treatment provided mild relief.     Constitution: Negative for chills, fatigue and fever.   Cardiovascular:  Negative for chest pain.   Musculoskeletal:  Positive for pain. Negative for trauma, joint pain, joint swelling, abnormal ROM of joint and pain with walking.   Neurological:  Negative for numbness.    Objective:     Physical Exam   Constitutional: He is oriented to person, place, and time. He appears well-developed. He is cooperative.  Non-toxic appearance. He does not appear ill. No distress.   HENT:   Head: Normocephalic and atraumatic.   Nose: Nose normal.   Mouth/Throat: Oropharynx is clear and moist and mucous membranes are normal.   Eyes: Conjunctivae and lids are normal.   Neck: " Trachea normal and phonation normal. Neck supple. No crepitus. There are no signs of injury. No torticollis present.      Comments: + spurling's test   Good strength and sensation distally.   No edema present. No erythema present. No neck rigidity present. No decreased range of motion present. pain with movement present. No spinous process tenderness present. muscular tenderness present.   Cardiovascular: Normal rate, regular rhythm, normal heart sounds and normal pulses.   Pulmonary/Chest: Effort normal and breath sounds normal.   Abdominal: Normal appearance and bowel sounds are normal. He exhibits no mass. Soft.   Musculoskeletal:         General: No deformity.   Neurological: He is alert and oriented to person, place, and time. He has normal strength and normal reflexes. No sensory deficit.   Skin: Skin is warm, dry, intact and not diaphoretic.   Psychiatric: His speech is normal and behavior is normal. Judgment and thought content normal.   Nursing note and vitals reviewed.    XR Cervical Spine 2 or 3 Views    Result Date: 4/22/2023  EXAMINATION: XR CERVICAL SPINE 2 OR 3 VIEWS CLINICAL HISTORY: Pain in right arm TECHNIQUE: AP, lateral and swimmer's views of the cervical spine were performed. COMPARISON: None. FINDINGS: The cervical spine is visualized from the craniocervical junction to the inferior endplate of C7 on the lateral view. There is no acute fracture or subluxation of the cervical spine.  The vertebral body heights are preserved. Alignment is normal. The disc heights are preserved. There are no significant degenerative changes. The remaining visualized osseous structures are intact. Prevertebral soft tissues are within normal limits.     No acute osseous abnormality of the cervical spine. Electronically signed by: Wei Enriquez Date:    04/22/2023 Time:    17:00    Assessment:     1. Pain of right upper extremity    2. Neck pain    3. Radiculopathy, unspecified spinal region        Plan:   Xray  reviewed.    Pain of right upper extremity  -     XR Cervical Spine 2 or 3 Views; Future; Expected date: 04/22/2023  -     ketorolac injection 30 mg  -     naproxen (NAPROSYN) 500 MG tablet; Take 1 tablet (500 mg total) by mouth 2 (two) times daily with meals. for 10 days  Dispense: 20 tablet; Refill: 0  -     methocarbamoL (ROBAXIN) 500 MG Tab; Take 1 tablet (500 mg total) by mouth 3 (three) times daily. As needed for muscle spasm. May cause drowsiness for 10 days  Dispense: 30 tablet; Refill: 0    Neck pain  -     XR Cervical Spine 2 or 3 Views; Future; Expected date: 04/22/2023  -     ketorolac injection 30 mg  -     naproxen (NAPROSYN) 500 MG tablet; Take 1 tablet (500 mg total) by mouth 2 (two) times daily with meals. for 10 days  Dispense: 20 tablet; Refill: 0  -     methocarbamoL (ROBAXIN) 500 MG Tab; Take 1 tablet (500 mg total) by mouth 3 (three) times daily. As needed for muscle spasm. May cause drowsiness for 10 days  Dispense: 30 tablet; Refill: 0    Radiculopathy, unspecified spinal region      Patient Instructions   Please drink plenty of fluids.  Please get plenty of rest.  Please return here or go to the Emergency Department for any concerns or worsening of condition.  If you were prescribed a muscle relaxant medication, do not drive or operate heavy equipment or machinery while taking these medications.  If you were not prescribed an anti-inflammatory medication, and if you do not have any history of stomach/intestinal ulcers, or kidney disease, or are not taking a blood thinner such as Coumadin, Plavix, Pradaxa, Eloquis, or Xaralta for example, it is OK to take over the counter Ibuprofen or Advil or Motrin or Aleve as directed.  Do not take these medications on an empty stomach.  If you lose control of your bowel and/or bladder, please go to the nearest Emergency Department immediately.  If you lose sensation in between your legs by your genitalia and/or rectum, please go to the nearest Emergency  Department immediately.  If you lose control or sensation of any extremity, please go to the nearest Emergency Department immediately.  Please follow up with your primary care doctor or specialist as needed.    If you  smoke, please stop smoking.

## 2023-04-25 ENCOUNTER — OFFICE VISIT (OUTPATIENT)
Dept: URGENT CARE | Facility: CLINIC | Age: 48
End: 2023-04-25
Payer: COMMERCIAL

## 2023-04-25 VITALS
HEIGHT: 67 IN | OXYGEN SATURATION: 97 % | RESPIRATION RATE: 19 BRPM | WEIGHT: 180 LBS | BODY MASS INDEX: 28.25 KG/M2 | HEART RATE: 81 BPM | SYSTOLIC BLOOD PRESSURE: 160 MMHG | TEMPERATURE: 98 F | DIASTOLIC BLOOD PRESSURE: 110 MMHG

## 2023-04-25 DIAGNOSIS — I10 ELEVATED BLOOD PRESSURE READING IN OFFICE WITH DIAGNOSIS OF HYPERTENSION: ICD-10-CM

## 2023-04-25 DIAGNOSIS — M54.12 CERVICAL RADICULOPATHY: Primary | ICD-10-CM

## 2023-04-25 PROCEDURE — 99213 PR OFFICE/OUTPT VISIT, EST, LEVL III, 20-29 MIN: ICD-10-PCS | Mod: S$GLB,,,

## 2023-04-25 PROCEDURE — 99213 OFFICE O/P EST LOW 20 MIN: CPT | Mod: S$GLB,,,

## 2023-04-25 RX ORDER — METHYLPREDNISOLONE 4 MG/1
TABLET ORAL
Qty: 21 EACH | Refills: 0 | Status: SHIPPED | OUTPATIENT
Start: 2023-04-25 | End: 2023-05-16

## 2023-04-25 RX ORDER — CYCLOBENZAPRINE HCL 10 MG
10 TABLET ORAL 3 TIMES DAILY PRN
Qty: 30 TABLET | Refills: 0 | Status: SHIPPED | OUTPATIENT
Start: 2023-04-25 | End: 2023-05-05

## 2023-04-25 NOTE — PATIENT INSTRUCTIONS
PLEASE READ YOUR DISCHARGE INSTRUCTIONS ENTIRELY AS IT CONTAINS IMPORTANT INFORMATION.  A referral was placed for you, call [phone] to schedule appointment.    Please drink plenty of fluids.    Please get plenty of rest.    Ice to the area.     You may do gently stretching if tolerable.     Please return here or go to the Emergency Department for any concerns or worsening of condition.     If you were prescribed a narcotic medication or muscle relaxer (Flexeril), do not drive or operate heavy equipment or machinery while taking these medications. Take a half first to see how it affects you     If you were not prescribed an anti-inflammatory medication, and if you do not have any history of stomach/intestinal ulcers, or kidney disease, or are not taking a blood thinner such as Coumadin, Plavix, Pradaxa, Eloquis, or Xaralta for example, it is OK to take over the counter Ibuprofen or Advil or Motrin or Aleve as directed.  Do not take these medications on an empty stomach.     Do not stay in one position to long.  When sleeping on your back place a pillow under knees to reduce tension on back.  If sleeping on your side, place pillow between knees to keep spine in better alinement.  Wear supportive shoes such as tennis shoes for support of the lower back.  Take any medication as directed.     Over the counter creams like icy hot, salon pas, biofreeze to the area     Avoid heavy lifting or straining. Good posture. Do not look down at a computer or phone all day. Do not slouch when you drive       Go to the ER if you develop headache or fever.     Please follow up with your primary care doctor or specialist as needed.     If you  smoke, please stop smoking.        Please arrange follow up with your primary medical clinic as soon as possible. You must understand that you've received an Urgent Care treatment only and that you may be released before all of your medical problems are known or treated. You, the patient, will  arrange for follow up as instructed. If your symptoms worsen or fail to improve you should go to the Emergency Room.

## 2023-04-25 NOTE — PROGRESS NOTES
"Subjective:      Patient ID: Camron Knight Jr. is a 48 y.o. male.    Vitals:  height is 5' 7" (1.702 m) and weight is 81.6 kg (180 lb). His temperature is 98.3 °F (36.8 °C). His blood pressure is 156/93 (abnormal) and his pulse is 78. His respiration is 19 and oxygen saturation is 98%.     Chief Complaint: Arm Pain (Right Shoulder , Arm and down his fingers )    47yo male pt presents to the clinic with pain in his neck that radiates down to his right right arm from his shoulders to fingers. Pt was seen here last Saturday and reports that the muscle relaxer and the injection he received gave him no relief. Pt reports no known trauma.     Arm Pain   The incident occurred more than 1 week ago. There was no injury mechanism. The pain is present in the right shoulder, upper right arm, right fingers and right forearm. The quality of the pain is described as aching, shooting and stabbing. The pain radiates to the right hand and right neck. The pain is at a severity of 8/10. The pain is moderate. The pain has been Constant since the incident. Associated symptoms include numbness and tingling. Pertinent negatives include no chest pain or muscle weakness. The symptoms are aggravated by movement and lifting. Treatments tried: Rx- Meds given last visit. The treatment provided no relief.     Cardiovascular:  Negative for chest pain.   Neurological:  Positive for numbness.    Objective:     Physical Exam   Constitutional: He is oriented to person, place, and time. He appears well-developed. He is cooperative. No distress.   HENT:   Head: Normocephalic and atraumatic.   Nose: Nose normal.   Mouth/Throat: Oropharynx is clear and moist and mucous membranes are normal.   Eyes: Conjunctivae and lids are normal.   Neck: Trachea normal and phonation normal. Neck supple. No decreased range of motion present. pain with movement present.   Cardiovascular: Normal rate, regular rhythm, normal heart sounds and normal pulses. "   Pulmonary/Chest: Effort normal and breath sounds normal.   Abdominal: Normal appearance and bowel sounds are normal. He exhibits no mass. Soft.   Musculoskeletal:         General: No deformity.      Cervical back: He exhibits tenderness. He exhibits no bony tenderness, no swelling and no spasm.        Back:       Comments: TTP to right shoulder /neck, FROM (pain with abduction), cap refill <2 seconds, radial pulse 2+   Neurological: He is alert and oriented to person, place, and time. He has normal strength and normal reflexes. No sensory deficit.   Skin: Skin is warm, dry, intact and not diaphoretic.   Psychiatric: His speech is normal and behavior is normal. Judgment and thought content normal.   Nursing note and vitals reviewed.    Assessment:     1. Cervical radiculopathy        Plan:     Cervical radiculopathy  -     methylPREDNISolone (MEDROL DOSEPACK) 4 mg tablet; use as directed  Dispense: 21 each; Refill: 0    Elevated blood pressure reading in office with diagnosis of hypertension    Other orders  -     cyclobenzaprine (FLEXERIL) 10 MG tablet; Take 1 tablet (10 mg total) by mouth 3 (three) times daily as needed for Muscle spasms.  Dispense: 30 tablet; Refill: 0        Medical Decision Making:   History:   Old Medical Records: I decided to obtain old medical records.  Urgent Care Management:  Patient no acute distress.  Blood pressure elevated today in clinic.  Discussed monitoring blood pressure at home and bring log with him to PCP if blood pressure stays elevated.  Discussed treatment of cervical radiculopathy with Flexeril and Medrol Dosepak. Discussed referral to ortho if symptoms do not improve over next several weeks. Discussed the importance of further evaluation if symptoms worsen. Patient stated verbal understanding.       Patient Instructions   PLEASE READ YOUR DISCHARGE INSTRUCTIONS ENTIRELY AS IT CONTAINS IMPORTANT INFORMATION.  A referral was placed for you, call [phone] to schedule  appointment.    Please drink plenty of fluids.    Please get plenty of rest.    Ice to the area.     You may do gently stretching if tolerable.     Please return here or go to the Emergency Department for any concerns or worsening of condition.     If you were prescribed a narcotic medication or muscle relaxer (Flexeril), do not drive or operate heavy equipment or machinery while taking these medications. Take a half first to see how it affects you     If you were not prescribed an anti-inflammatory medication, and if you do not have any history of stomach/intestinal ulcers, or kidney disease, or are not taking a blood thinner such as Coumadin, Plavix, Pradaxa, Eloquis, or Xaralta for example, it is OK to take over the counter Ibuprofen or Advil or Motrin or Aleve as directed.  Do not take these medications on an empty stomach.     Do not stay in one position to long.  When sleeping on your back place a pillow under knees to reduce tension on back.  If sleeping on your side, place pillow between knees to keep spine in better alinement.  Wear supportive shoes such as tennis shoes for support of the lower back.  Take any medication as directed.     Over the counter creams like icy hot, salon pas, biofreeze to the area     Avoid heavy lifting or straining. Good posture. Do not look down at a computer or phone all day. Do not slouch when you drive       Go to the ER if you develop headache or fever.     Please follow up with your primary care doctor or specialist as needed.     If you  smoke, please stop smoking.        Please arrange follow up with your primary medical clinic as soon as possible. You must understand that you've received an Urgent Care treatment only and that you may be released before all of your medical problems are known or treated. You, the patient, will arrange for follow up as instructed. If your symptoms worsen or fail to improve you should go to the Emergency Room.

## 2023-05-09 ENCOUNTER — TELEPHONE (OUTPATIENT)
Dept: ORTHOPEDICS | Facility: CLINIC | Age: 48
End: 2023-05-09
Payer: COMMERCIAL

## 2023-05-09 ENCOUNTER — HOSPITAL ENCOUNTER (OUTPATIENT)
Dept: RADIOLOGY | Facility: HOSPITAL | Age: 48
Discharge: HOME | End: 2023-05-09
Attending: ORTHOPAEDIC SURGERY
Payer: COMMERCIAL

## 2023-05-09 ENCOUNTER — OFFICE VISIT (OUTPATIENT)
Dept: ORTHOPEDICS | Facility: CLINIC | Age: 48
End: 2023-05-09
Payer: COMMERCIAL

## 2023-05-09 VITALS
HEART RATE: 80 BPM | HEIGHT: 67 IN | SYSTOLIC BLOOD PRESSURE: 158 MMHG | DIASTOLIC BLOOD PRESSURE: 93 MMHG | BODY MASS INDEX: 28.75 KG/M2 | WEIGHT: 183.19 LBS

## 2023-05-09 VITALS — WEIGHT: 182.75 LBS | BODY MASS INDEX: 27.7 KG/M2 | HEIGHT: 68 IN

## 2023-05-09 DIAGNOSIS — M47.812 CERVICAL SPONDYLOSIS: Primary | ICD-10-CM

## 2023-05-09 DIAGNOSIS — R52 PAIN: ICD-10-CM

## 2023-05-09 DIAGNOSIS — M54.12 CERVICAL RADICULOPATHY: Primary | ICD-10-CM

## 2023-05-09 DIAGNOSIS — M50.30 DDD (DEGENERATIVE DISC DISEASE), CERVICAL: Primary | ICD-10-CM

## 2023-05-09 DIAGNOSIS — M54.12 CERVICAL RADICULOPATHY: ICD-10-CM

## 2023-05-09 DIAGNOSIS — M50.30 DDD (DEGENERATIVE DISC DISEASE), CERVICAL: ICD-10-CM

## 2023-05-09 DIAGNOSIS — R52 PAIN: Primary | ICD-10-CM

## 2023-05-09 PROCEDURE — 3080F DIAST BP >= 90 MM HG: CPT | Mod: CPTII,S$GLB,, | Performed by: ORTHOPAEDIC SURGERY

## 2023-05-09 PROCEDURE — 73030 X-RAY EXAM OF SHOULDER: CPT | Mod: TC,PN,RT

## 2023-05-09 PROCEDURE — 3077F SYST BP >= 140 MM HG: CPT | Mod: CPTII,S$GLB,, | Performed by: ORTHOPAEDIC SURGERY

## 2023-05-09 PROCEDURE — 99214 PR OFFICE/OUTPT VISIT, EST, LEVL IV, 30-39 MIN: ICD-10-PCS | Mod: S$GLB,,, | Performed by: ORTHOPAEDIC SURGERY

## 2023-05-09 PROCEDURE — 3008F PR BODY MASS INDEX (BMI) DOCUMENTED: ICD-10-PCS | Mod: CPTII,S$GLB,, | Performed by: ORTHOPAEDIC SURGERY

## 2023-05-09 PROCEDURE — 99999 PR PBB SHADOW E&M-EST. PATIENT-LVL III: CPT | Mod: PBBFAC,,, | Performed by: ORTHOPAEDIC SURGERY

## 2023-05-09 PROCEDURE — 99999 PR PBB SHADOW E&M-EST. PATIENT-LVL IV: CPT | Mod: PBBFAC,,, | Performed by: ORTHOPAEDIC SURGERY

## 2023-05-09 PROCEDURE — 3077F PR MOST RECENT SYSTOLIC BLOOD PRESSURE >= 140 MM HG: ICD-10-PCS | Mod: CPTII,S$GLB,, | Performed by: ORTHOPAEDIC SURGERY

## 2023-05-09 PROCEDURE — 99999 PR PBB SHADOW E&M-EST. PATIENT-LVL IV: ICD-10-PCS | Mod: PBBFAC,,, | Performed by: ORTHOPAEDIC SURGERY

## 2023-05-09 PROCEDURE — 99204 PR OFFICE/OUTPT VISIT, NEW, LEVL IV, 45-59 MIN: ICD-10-PCS | Mod: S$GLB,,, | Performed by: ORTHOPAEDIC SURGERY

## 2023-05-09 PROCEDURE — 73030 XR SHOULDER COMPLETE 2 OR MORE VIEWS RIGHT: ICD-10-PCS | Mod: 26,RT,, | Performed by: RADIOLOGY

## 2023-05-09 PROCEDURE — 1160F PR REVIEW ALL MEDS BY PRESCRIBER/CLIN PHARMACIST DOCUMENTED: ICD-10-PCS | Mod: CPTII,S$GLB,, | Performed by: ORTHOPAEDIC SURGERY

## 2023-05-09 PROCEDURE — 1160F RVW MEDS BY RX/DR IN RCRD: CPT | Mod: CPTII,S$GLB,, | Performed by: ORTHOPAEDIC SURGERY

## 2023-05-09 PROCEDURE — 1159F PR MEDICATION LIST DOCUMENTED IN MEDICAL RECORD: ICD-10-PCS | Mod: CPTII,S$GLB,, | Performed by: ORTHOPAEDIC SURGERY

## 2023-05-09 PROCEDURE — 99999 PR PBB SHADOW E&M-EST. PATIENT-LVL III: ICD-10-PCS | Mod: PBBFAC,,, | Performed by: ORTHOPAEDIC SURGERY

## 2023-05-09 PROCEDURE — 3008F BODY MASS INDEX DOCD: CPT | Mod: CPTII,S$GLB,, | Performed by: ORTHOPAEDIC SURGERY

## 2023-05-09 PROCEDURE — 73030 X-RAY EXAM OF SHOULDER: CPT | Mod: 26,RT,, | Performed by: RADIOLOGY

## 2023-05-09 PROCEDURE — 1159F MED LIST DOCD IN RCRD: CPT | Mod: CPTII,S$GLB,, | Performed by: ORTHOPAEDIC SURGERY

## 2023-05-09 PROCEDURE — 99214 OFFICE O/P EST MOD 30 MIN: CPT | Mod: S$GLB,,, | Performed by: ORTHOPAEDIC SURGERY

## 2023-05-09 PROCEDURE — 3080F PR MOST RECENT DIASTOLIC BLOOD PRESSURE >= 90 MM HG: ICD-10-PCS | Mod: CPTII,S$GLB,, | Performed by: ORTHOPAEDIC SURGERY

## 2023-05-09 PROCEDURE — 99204 OFFICE O/P NEW MOD 45 MIN: CPT | Mod: S$GLB,,, | Performed by: ORTHOPAEDIC SURGERY

## 2023-05-09 RX ORDER — GABAPENTIN 300 MG/1
300 CAPSULE ORAL 3 TIMES DAILY
Qty: 60 CAPSULE | Refills: 1 | Status: SHIPPED | OUTPATIENT
Start: 2023-05-09

## 2023-05-09 NOTE — PROGRESS NOTES
DATE: 5/9/2023  PATIENT: Camron Knight Jr.    Attending Physician: Jesus Acuña M.D.    CHIEF COMPLAINT: neck and RUE pain    HISTORY:  Camron Knight Jr. is a 48 y.o. male presents for initial evaluation of neck and right arm pain (Neck - 6, Arm - 6). The pain has been present for 3 weeks. The patient describes the pain as dull and it radiates down RUE to the fingers. The pain is worse with activity and improved by rest. There is RUE associated numbness and tingling. There is RUE subjective weakness. Prior treatments have included meds (naproxen, medrol dose pack, robaxin), but no PT, WILLA or surgery.     The Patient denies myelopathic symptoms such as handwriting changes or difficulty with buttons/coins/keys. Denies perineal paresthesias, bowel/bladder dysfunction.    The patient does not smoke, have DM or endorse IVDU. The patient is not on any blood thinners and does not take chronic narcotics. He works at the [employer].    PAST MEDICAL/SURGICAL HISTORY:  Past Medical History:   Diagnosis Date    Hypertension      History reviewed. No pertinent surgical history.    Current Medications:   Current Outpatient Medications:     amlodipine (NORVASC) 10 MG tablet, Take 10 mg by mouth once daily., Disp: , Rfl:     doxycycline (VIBRAMYCIN) 100 MG Cap, Take 100 mg by mouth 2 (two) times daily., Disp: , Rfl:     methylPREDNISolone (MEDROL DOSEPACK) 4 mg tablet, use as directed, Disp: 21 each, Rfl: 0    albuterol (PROVENTIL/VENTOLIN HFA) 90 mcg/actuation inhaler, Inhale 1-2 puffs into the lungs every 6 (six) hours as needed for Wheezing or Shortness of Breath. Rescue, Disp: 8 g, Rfl: 0    azithromycin (Z-COLE) 250 MG tablet, Take 1 tablet (250 mg total) by mouth once daily. Take first 2 tablets together, then 1 every day until finished. (Patient not taking: Reported on 4/25/2023), Disp: 6 tablet, Rfl: 0    gabapentin (NEURONTIN) 300 MG capsule, Take 1 capsule (300 mg total) by mouth 3 (three) times daily., Disp: 60  capsule, Rfl: 1    loratadine (CLARITIN) 10 mg tablet, Take 1 tablet (10 mg total) by mouth once daily., Disp: 30 tablet, Rfl: 0    Current Facility-Administered Medications:     acetaminophen tablet 650 mg, 650 mg, Oral, Once PRN, Getachew Corrales MD    albuterol inhaler 2 puff, 2 puff, Inhalation, Q20 Min PRN, Getachew Corrales MD    diphenhydrAMINE injection 25 mg, 25 mg, Intravenous, Once PRN, Getachew Corrales MD    EPINEPHrine (EPIPEN) 0.3 mg/0.3 mL pen injection 0.3 mg, 0.3 mg, Intramuscular, PRN, Getachew Corrales MD    methylPREDNISolone sodium succinate injection 40 mg, 40 mg, Intravenous, Once PRN, Getachew Corrales MD    ondansetron disintegrating tablet 4 mg, 4 mg, Oral, Once PRN, Getachew Corrales MD    sodium chloride 0.9% 500 mL flush bag, , Intravenous, PRN, Getachew Corrales MD    sodium chloride 0.9% flush 10 mL, 10 mL, Intravenous, PRN, Getachew Corrales MD    Social History:   Social History     Socioeconomic History    Marital status: Single   Occupational History    Occupation:    Tobacco Use    Smoking status: Never    Smokeless tobacco: Never   Substance and Sexual Activity    Alcohol use: No    Drug use: No    Sexual activity: Never       REVIEW OF SYSTEMS:  Constitution: Negative. Negative for chills, fever and night sweats.   Cardiovascular: Negative for chest pain and syncope.   Respiratory: Negative for cough and shortness of breath.   Gastrointestinal: See HPI. Negative for nausea/vomiting. Negative for abdominal pain.  Genitourinary: See HPI. Negative for discoloration or dysuria.  Skin: Negative for dry skin, itching and rash.   Hematologic/Lymphatic: negative for bleeding/clotting disorders.   Musculoskeletal: Negative for falls and muscle weakness.   Neurological: See HPI. no history of seizures. no history of cranial surgery or shunts.  Endocrine: Negative for polydipsia, polyphagia and polyuria.   Allergic/Immunologic: Negative for hives and  "persistent infections.  Psychiatric/Behavioral: Negative for depression and insomnia.         EXAM:  Ht 5' 7.5" (1.715 m)   Wt 82.9 kg (182 lb 12.2 oz)   BMI 28.20 kg/m²     General: The patient is a 48 y.o. male in no apparent distress, the patient is orientatied to person, place and time.  Psych: Normal mood and affect  HEENT: Vision grossly intact, hearing intact to the spoken word.  Lungs: Respirations unlabored.  Gait: Normal station and gait, no difficulty with toe or heel walk.   Skin: Cervical skin negative for rashes, lesions, hairy patches and surgical scars.  Range of motion: Cervical range of motion is acceptable. There is posterior tenderness to palpation. TTP at R medial scapula above the scapular spine.  Spinal Balance: Global saggital and coronal spinal balance acceptable, no significant for scoliosis and kyphosis.  Musculoskeletal: No pain with the range of motion of the bilateral shoulders and elbows. Normal bulk and contour of the bilateral hands.  Vascular: Bilateral hands warm and well perfused, Radial pulses 2+ bilaterally.  Neurological: Normal strength and tone in all major motor groups in the bilateral upper and lower extremities except 4/5 in R EE and EF. Normal sensation to light touch in the C5-T1 and L2-S1 dermatomes bilaterally.  Deep tendon reflexes symmetric 2+ in the bilateral upper and lower extremities.  Negative Inverted Radial Reflex and Reed's bilaterally. Negative Babinski bilaterally.     IMAGING:   Today I independently reviewed the following images and my interpretations are as follows:    AP, Lat and Flex/Ex  upright C-spine films demonstrate mild DDD without listhesis.     Body mass index is 28.2 kg/m².  No results found for: HGBA1C    ASSESSMENT/PLAN:  Cervical spondylosis    DDD (degenerative disc disease), cervical    Cervical radiculopathy  -     Ambulatory referral/consult to Physical/Occupational Therapy; Future; Expected date: 05/16/2023  -     gabapentin " (NEURONTIN) 300 MG capsule; Take 1 capsule (300 mg total) by mouth 3 (three) times daily.  Dispense: 60 capsule; Refill: 1      Follow up if symptoms worsen or fail to improve.    Patient has cervical DDD and RUE radiculopathy. I discussed the natural history of their diagnoses as well as surgical and nonsurgical treatment options. I educated the patient on the importance of core/back strengthening, correct posture, bending/lifting ergonomics, and low-impact aerobic exercises (walking, elliptical, and aquatherapy). I prescribed gabapentin. Continue medications. I will refer the patient to PT for neck ROM. Patient will follow up PRN. Next step is a cervical MRI.    Jesus Acuña MD  Orthopaedic Spine Surgeon  Department of Orthopaedic Surgery  [phone]

## 2023-05-10 NOTE — PROGRESS NOTES
Patient ID:   Camron Knight Jr. is a 48 y.o. male.    Chief Complaint:   Neck and right upper extremity pain    HPI:   The patient is a 48 year old male who is presenting with a several week history of pain starting in the neck and traveling down his extremity to the hand. He describes the pain as a burning pain and has associated numbness and paresthesias He denies any known injury. Pain level is 7/10. He has tried a Medrol Dose pack, Flexeril and gabapentin. He was first seen in Urgent Care and then by ortho spine today. I'm not sure why he was scheduled to also see me today. Basically, conservative treatment has been minimal up to this point. His current pain pattern correlates with a C6 distribution.    Medications:    Current Outpatient Medications:     amlodipine (NORVASC) 10 MG tablet, Take 10 mg by mouth once daily., Disp: , Rfl:     azithromycin (Z-COLE) 250 MG tablet, Take 1 tablet (250 mg total) by mouth once daily. Take first 2 tablets together, then 1 every day until finished., Disp: 6 tablet, Rfl: 0    doxycycline (VIBRAMYCIN) 100 MG Cap, Take 100 mg by mouth 2 (two) times daily., Disp: , Rfl:     gabapentin (NEURONTIN) 300 MG capsule, Take 1 capsule (300 mg total) by mouth 3 (three) times daily., Disp: 60 capsule, Rfl: 1    methylPREDNISolone (MEDROL DOSEPACK) 4 mg tablet, use as directed, Disp: 21 each, Rfl: 0    albuterol (PROVENTIL/VENTOLIN HFA) 90 mcg/actuation inhaler, Inhale 1-2 puffs into the lungs every 6 (six) hours as needed for Wheezing or Shortness of Breath. Rescue, Disp: 8 g, Rfl: 0    loratadine (CLARITIN) 10 mg tablet, Take 1 tablet (10 mg total) by mouth once daily., Disp: 30 tablet, Rfl: 0    Current Facility-Administered Medications:     acetaminophen tablet 650 mg, 650 mg, Oral, Once PRN, Getachew Corrales MD    albuterol inhaler 2 puff, 2 puff, Inhalation, Q20 Min PRN, Getachew Corrales MD    diphenhydrAMINE injection 25 mg, 25 mg, Intravenous, Once PRN, Getachew Corrales  "MD    EPINEPHrine (EPIPEN) 0.3 mg/0.3 mL pen injection 0.3 mg, 0.3 mg, Intramuscular, PRN, Getachew Corrales MD    methylPREDNISolone sodium succinate injection 40 mg, 40 mg, Intravenous, Once PRN, Getachew Corrales MD    ondansetron disintegrating tablet 4 mg, 4 mg, Oral, Once PRN, Getachew Corrales MD    sodium chloride 0.9% 500 mL flush bag, , Intravenous, PRN, Getachew Corrales MD    sodium chloride 0.9% flush 10 mL, 10 mL, Intravenous, PRN, Getachew Corrales MD    Allergies:  Review of patient's allergies indicates:  No Known Allergies    Past Medical History:  Past Medical History:   Diagnosis Date    Hypertension         Past Surgical History:  History reviewed. No pertinent surgical history.    Social History:  Social History     Occupational History    Occupation:    Tobacco Use    Smoking status: Never    Smokeless tobacco: Never   Substance and Sexual Activity    Alcohol use: No    Drug use: No    Sexual activity: Never       Family History:  History reviewed. No pertinent family history.     ROS:  Review of Systems   Musculoskeletal:  Positive for myalgias and neck pain.   Neurological:  Positive for numbness and paresthesias.     Vitals:  BP (!) 158/93   Pulse 80   Ht 5' 7" (1.702 m)   Wt 83.1 kg (183 lb 3.2 oz)   BMI 28.69 kg/m²     Physical Examination:  Comprehensive Orthopaedic Musculoskeletal Exam    General      Constitutional: appears stated age, well-developed and well-nourished    Scleral icterus: no    Labored breathing: no    Psychiatric: normal mood and affect and no acute distress    Neurological: alert and oriented x3    Skin: intact    Lymphadenopathy: none   Ortho Exam   Cervical exam:  Limited ROM especially in extension  Painful Spurlings but not down the arm    Right lower extremity exam:  Shoulder ROM full.  Shoulder RTC strength 5/5.     Manual motor intact.    Decreased sensation in C6 distribution    DTRs: 2+ bilateral biceps and BR and 1+ bilateral " triceps    Imaging:  I have independently reviewed the following imaging studies performed at [facility]:    X-ray Shoulder 2 or More Views Right  Narrative: EXAMINATION:  XR SHOULDER COMPLETE 2 OR MORE VIEWS RIGHT    CLINICAL HISTORY:  Pain, unspecified    TECHNIQUE:  Two or three views of the right shoulder were performed.    COMPARISON:  None    FINDINGS:  Right glenohumeral and AC joint articulations appear intact without acute fracture, dislocation, or osseous destruction.  Impression: As above.    Electronically signed by: Alejo Soriano  Date:    05/09/2023  Time:    15:16    Assessment:  1. Cervical radiculopathy      Plan:  The patient has cervical radiculopathy. I have recommended that he participate in physical therapy as directed by ortho spine.      No follow-ups on file.